# Patient Record
Sex: FEMALE | Race: OTHER | URBAN - METROPOLITAN AREA
[De-identification: names, ages, dates, MRNs, and addresses within clinical notes are randomized per-mention and may not be internally consistent; named-entity substitution may affect disease eponyms.]

---

## 2018-11-13 ENCOUNTER — OFFICE VISIT (OUTPATIENT)
Dept: FAMILY MEDICINE CLINIC | Facility: CLINIC | Age: 43
End: 2018-11-13

## 2018-11-13 VITALS
HEIGHT: 63 IN | RESPIRATION RATE: 16 BRPM | SYSTOLIC BLOOD PRESSURE: 118 MMHG | HEART RATE: 77 BPM | DIASTOLIC BLOOD PRESSURE: 82 MMHG | BODY MASS INDEX: 38.98 KG/M2 | WEIGHT: 220 LBS | OXYGEN SATURATION: 98 % | TEMPERATURE: 98 F

## 2018-11-13 DIAGNOSIS — J06.9 VIRAL URI WITH COUGH: Primary | ICD-10-CM

## 2018-11-13 PROCEDURE — 99202 OFFICE O/P NEW SF 15 MIN: CPT | Performed by: NURSE PRACTITIONER

## 2018-11-14 NOTE — PATIENT INSTRUCTIONS
Enfermedad Respiratoria Viral [Uri, Viral Respiratory Illness, Adult, No Abx]  Usted tiene june enfermedad respiratoria de las vías superiores provocada por un virus. Esta enfermedad es contagiosa timoteo los primeros días.  Se transmite por Mateo Sanchez, por Busque Prontamente Atención Médica  si algo de lo siguiente ocurre:  -- Tos con esputo de color (mucosidad) o con ayana.   -- Dolor en el pecho, falta de aire, silbidos o dificultad para respirar.  -- Dolor de carlito danial, dolor en la josephine, el sruthi o l · Los esprays salinos nasales y las pastillas descongestionantes ayudan a despejar la nariz cuando está obstruida. Los antihistamínicos también pueden ayudar, mike pueden causar efectos secundarios, jean somnolencia y sequedad de los ojos, la nariz y la nicola · Paraudra en thalia sanford irritada  Date Last Reviewed: 8/24/2017  © 8526-6868 [employer]. [address] [facility], [address] [place] [address]. Todos los derechos reservados.  Esta información no pretende sustituir la Ervin

## 2018-11-14 NOTE — PROGRESS NOTES
CHIEF COMPLAINT:   Patient presents with:  Cough  URI      HPI:   Loyda Hinkle is a 44year old female who presents for uri symptoms for  3 days. Patient reports sore throat, congestion, dry cough. Symptoms have been worsening since onset.   Lakhwinder LYMPH:  + tender anterior cervical lad. NEURO:  No focal deficits.     ASSESSMENT AND PLAN:   Mauricio Farley is a 44year old female who presents with upper respiratory symptoms that are consistent with    ASSESSMENT:   Viral uri with cough  (prima 3) Puede usar Tylenol (acetaminofén) o ibuprofeno (Motrin o Advil) para controlar la fiebre o el dolor muscular y el dolor de [place].  (La aspirina no debe usarse [employer] de 18 años enfermas con fiebre, ya que puede causar daños graves al Los resfriados son causados por virus y no pueden curarse con antibióticos. Sin embargo, es posible [employer] síntomas y ayudar al organismo a curarse a sí mismo.  Independientemente de cuáles dae nathalia síntomas, asegúrese de beber abundante líquido (agua · A medida que recupere cerrato apetito, puede regresar a cerrato dieta normal. Pregúntele a cerrato médico si debe evitar algunos alimentos.   Cuándo debe buscar atención médica  Ackerman pronto jean note síntomas, pida a cerrato proveedor de [employer]

## 2021-10-22 ENCOUNTER — OFFICE VISIT (OUTPATIENT)
Dept: OBGYN CLINIC | Facility: CLINIC | Age: 46
End: 2021-10-22

## 2021-10-22 VITALS — WEIGHT: 206 LBS | BODY MASS INDEX: 36 KG/M2 | DIASTOLIC BLOOD PRESSURE: 75 MMHG | SYSTOLIC BLOOD PRESSURE: 149 MMHG

## 2021-10-22 DIAGNOSIS — R10.2 PELVIC PAIN: ICD-10-CM

## 2021-10-22 DIAGNOSIS — Z01.419 ENCOUNTER FOR WELL WOMAN EXAM WITH ROUTINE GYNECOLOGICAL EXAM: Primary | ICD-10-CM

## 2021-10-22 DIAGNOSIS — Z12.31 SCREENING MAMMOGRAM, ENCOUNTER FOR: ICD-10-CM

## 2021-10-22 PROCEDURE — 99386 PREV VISIT NEW AGE 40-64: CPT | Performed by: OBSTETRICS & GYNECOLOGY

## 2021-10-22 PROCEDURE — 3077F SYST BP >= 140 MM HG: CPT | Performed by: OBSTETRICS & GYNECOLOGY

## 2021-10-22 PROCEDURE — 3078F DIAST BP <80 MM HG: CPT | Performed by: OBSTETRICS & GYNECOLOGY

## 2021-10-22 NOTE — PROGRESS NOTES
Annual Gyn Exam    HPI  Yahir Culvermegan is new to my practice and is here for an annual gynecologic evaluation. Pt has concerns about pelvic pain that has been occurring over the last 8 days.     OB History    Para Term  AB Living   2 2 1     2   SAB stool, diarrhea, nausea and vomiting. Pt has a long history of constipation. Genitourinary: Negative for dyspareunia, dysuria, frequency, hematuria and vaginal discharge. Pt with mild incontinence but does not bother her.   Pt has occasional 36.49 kg/m²     Assessment and Plan  1.  Encounter for well woman exam with routine gynecological exam  A: 40 y.o. Gloria Kevin here for annual gynecologic evaluation  Exam, self breast exam demonstration done  Pap with HPV done  Discussed mammogram recommendati

## 2024-02-02 ENCOUNTER — OFFICE VISIT (OUTPATIENT)
Dept: FAMILY MEDICINE CLINIC | Facility: CLINIC | Age: 49
End: 2024-02-02

## 2024-02-02 VITALS
DIASTOLIC BLOOD PRESSURE: 60 MMHG | SYSTOLIC BLOOD PRESSURE: 134 MMHG | WEIGHT: 214 LBS | RESPIRATION RATE: 14 BRPM | HEART RATE: 67 BPM | OXYGEN SATURATION: 97 % | TEMPERATURE: 98 F | BODY MASS INDEX: 38 KG/M2

## 2024-02-02 DIAGNOSIS — R39.9 UTI SYMPTOMS: Primary | ICD-10-CM

## 2024-02-02 DIAGNOSIS — N89.8 VAGINA ITCHING: ICD-10-CM

## 2024-02-02 PROCEDURE — 99203 OFFICE O/P NEW LOW 30 MIN: CPT | Performed by: PHYSICIAN ASSISTANT

## 2024-02-02 RX ORDER — CEPHALEXIN 500 MG/1
500 CAPSULE ORAL 2 TIMES DAILY
Qty: 14 CAPSULE | Refills: 0 | Status: SHIPPED | OUTPATIENT
Start: 2024-02-02 | End: 2024-02-09

## 2024-02-02 RX ORDER — FLUCONAZOLE 150 MG/1
TABLET ORAL
Qty: 2 TABLET | Refills: 0 | Status: SHIPPED | OUTPATIENT
Start: 2024-02-02

## 2024-02-02 RX ORDER — NYSTATIN 100000 U/G
1 OINTMENT TOPICAL 2 TIMES DAILY
Qty: 1 EACH | Refills: 0 | Status: SHIPPED | OUTPATIENT
Start: 2024-02-02 | End: 2024-02-16

## 2024-02-02 NOTE — PROGRESS NOTES
CHIEF COMPLAINT:     Chief Complaint   Patient presents with    Urinary Symptoms       HPI:   Jasmin Vogt is a 48 year old female who presents with friend with symptoms of UTI. Complaining of urinary frequency, urgency, dysuria for last 1 days.  + hematuria. Denies flank pain, back pain, abdominal pain, fever, nausea, or vomiting.  + vaginal itching for one week- has never had in the past. Some odorous discharge. Clear small amount of discharge. No rashes or lesions. No new sexual partners in the last 3 months, or recent unprotected sexual intercourse. No hx of frequent UTIs or yeast/BV. Has been taking pyridium.     No current insurance     Current Outpatient Medications   Medication Sig Dispense Refill    cephalexin 500 MG Oral Cap Take 1 capsule (500 mg total) by mouth 2 (two) times daily for 7 days. 14 capsule 0    nystatin 100,000 Units/g External Ointment Apply 1 Application topically 2 (two) times daily for 14 days. 1 each 0    fluconazole (DIFLUCAN) 150 MG Oral Tab One tab day one and repeat in 72 hours 2 tablet 0    METFORMIN HCL OR Take by mouth.        Past Medical History:   Diagnosis Date    Diabetes ([facility])     Diabetes mellitus ([facility])       Social History:  Social History     Socioeconomic History    Marital status:    Tobacco Use    Smoking status: Never    Smokeless tobacco: Never   Substance and Sexual Activity    Alcohol use: Never    Drug use: Never         REVIEW OF SYSTEMS:   GENERAL: Denies fever, chills, or body aches  SKIN: no rashes, no skin wounds or ulcers.  CARDIOVASCULAR: denies chest pain or palpitations  LUNGS: denies shortness of breath, cough, or wheezing  GI: See HPI. No N/V/C/D.   : See HPI.  Musculo: No back pain     EXAM:   /60   Pulse 67   Temp 98.3 °F (36.8 °C) (Tympanic)   Resp 14   Wt 214 lb (97.1 kg)   SpO2 97%   BMI 37.91 kg/m²   Physical Exam  Constitutional:       General: She is not in acute distress.     Appearance: Normal appearance.    HENT:      Head: Normocephalic and atraumatic.   Cardiovascular:      Rate and Rhythm: Normal rate and regular rhythm.      Heart sounds: Normal heart sounds. No murmur heard.  Pulmonary:      Effort: Pulmonary effort is normal.      Breath sounds: Normal breath sounds. No wheezing or rhonchi.   Abdominal:      General: Abdomen is flat. Bowel sounds are normal. There is no distension.      Palpations: Abdomen is soft. There is no mass.      Tenderness: There is abdominal tenderness (suprapubic or bladder pressure. No tenderness throughout rest of abdomen). There is no right CVA tenderness, left CVA tenderness or guarding.   Genitourinary:     Pubic Area: No rash.       Labia:         Right: No lesion.         Left: No lesion.       Vagina: Erythema present. No vaginal discharge or lesions.      Cervix: Normal.      Comments: B/l labial erythema noted   Neurological:      Mental Status: She is alert.               ASSESSMENT AND PLAN:   Jasmin Vogt is a 48 year old female presents with UTI symptoms.    ASSESSMENT:  Encounter Diagnoses   Name Primary?    UTI symptoms Yes    Vagina itching      Patient took azo and unable to run UA in office. Declined culture due to cost. Discussed will treat based on symptoms but close follow up will be needed if symptoms persist or worsen     Patient declined vaginitis swab. Discussed based on exam possibility of yeast verses pre menopausal changes. Per patient irregular periods. Again if no improvement will need close PCP follow up     PLAN: Meds as listed below.  Comfort measures as described in Patient Instructions    Meds & Refills for this Visit:  Requested Prescriptions     Signed Prescriptions Disp Refills    cephalexin 500 MG Oral Cap 14 capsule 0     Sig: Take 1 capsule (500 mg total) by mouth 2 (two) times daily for 7 days.    nystatin 100,000 Units/g External Ointment 1 each 0     Sig: Apply 1 Application topically 2 (two) times daily for 14 days.     fluconazole (DIFLUCAN) 150 MG Oral Tab 2 tablet 0     Sig: One tab day one and repeat in 72 hours       Risk and benefits of medication discussed.   Stressed importance of completing full course of antibiotic unless told otherwise.     The patient and friend used for Welsh translation indicates understanding of these issues and agrees to the plan.  The patient is asked to see PCP in 3 days if not better. Seek care immediately for new onset of fever, vomiting, worsening symptoms.    Patient Instructions   Apply ointment to vagina externally only   Start antibiotic   Oral diflucan day one and then again in 72 hours   Push fluids   ED for abdominal pain, fever, vomiting   Close PCP follow up encouraged for persistent symptoms

## 2024-02-02 NOTE — PATIENT INSTRUCTIONS
Apply ointment to vagina externally only   Start antibiotic   Oral diflucan day one and then again in 72 hours   Push fluids   ED for abdominal pain, fever, vomiting   Close PCP follow up encouraged for persistent symptoms

## 2025-04-02 ENCOUNTER — OFFICE VISIT (OUTPATIENT)
Dept: FAMILY MEDICINE CLINIC | Facility: CLINIC | Age: 50
End: 2025-04-02

## 2025-04-02 VITALS
WEIGHT: 210 LBS | SYSTOLIC BLOOD PRESSURE: 135 MMHG | OXYGEN SATURATION: 97 % | BODY MASS INDEX: 37 KG/M2 | HEART RATE: 82 BPM | RESPIRATION RATE: 18 BRPM | TEMPERATURE: 98 F | DIASTOLIC BLOOD PRESSURE: 80 MMHG

## 2025-04-02 DIAGNOSIS — R19.7 DIARRHEA OF PRESUMED INFECTIOUS ORIGIN: Primary | ICD-10-CM

## 2025-04-02 PROCEDURE — 99213 OFFICE O/P EST LOW 20 MIN: CPT | Performed by: NURSE PRACTITIONER

## 2025-04-02 NOTE — PATIENT INSTRUCTIONS
Diarrhea is most likely viral and supportive care is recommended.  Hydration is key!  Seek medical care if unable to keep fluids down, experiencing increasing abdominal pain, or symptoms do not improve in 5 days.  Recommend the following supportive care for diarrhea:  -Push fluids (water, pedialyte, popsicles, jello, broth, soup)  -Avoid dairy and sugary substances as these can make diarrhea worse  -A BRAT/bland diet may be helpful - bananas, rice, applesauce, and toast, plain pasta.

## 2025-04-02 NOTE — PROGRESS NOTES
Subjective:   Patient ID: Jasmin Vogt is a 49 year old female.    Jasmin presented today with diarrhea x 3 days. She reports eating ceviche with family on Sunday then started having diarrhea the next day. No other family members who ate the ceviche became ill. She reports watery diarrhea x 8-10 on Monday, x 6-8 yesterday, and x 1 with stool incontinence today. She has cramping immediately before having diarrhea then the cramping subsides. She has a mild HA, decreased appetite, fatigue, and mild dizziness. She denies blood and mucus in stool, rash, fever, syncope, SOB, trouble breathing, racing heart, or URI symptoms. She has been able to drink fluids but does not have an appetite for food. She does not have any sick contacts. No recent travel. She took Pepto-Bismol today and immediately vomited. She has not tried any other OTC medications.        History/Other:   Review of Systems   Constitutional:  Positive for appetite change and fatigue. Negative for chills and fever.   HENT:  Negative for congestion, rhinorrhea and sore throat.    Respiratory:  Negative for cough and shortness of breath.    Cardiovascular:  Negative for chest pain and palpitations.   Gastrointestinal:  Positive for diarrhea and nausea. Negative for abdominal pain (+ cramping), blood in stool, constipation and vomiting.   Skin:  Negative for rash.   Neurological:  Positive for dizziness and headaches. Negative for syncope.     Current Outpatient Medications   Medication Sig Dispense Refill    fluconazole (DIFLUCAN) 150 MG Oral Tab One tab day one and repeat in 72 hours 2 tablet 0    METFORMIN HCL OR Take by mouth.       Allergies:Allergies[1]    /80 (BP Location: Right arm, Patient Position: Sitting, Cuff Size: large)   Pulse 82   Temp 97.5 °F (36.4 °C)   Resp 18   Wt 210 lb (95.3 kg)   SpO2 97%   BMI 37.20 kg/m²       Objective:   Physical Exam  Vitals reviewed.   Constitutional:       General: She is not in acute  distress.     Appearance: Normal appearance. She is well-developed and well-groomed. She is not ill-appearing.   HENT:      Head: Normocephalic.      Mouth/Throat:      Lips: Pink.      Mouth: Mucous membranes are moist. No oral lesions.      Pharynx: Oropharynx is clear. Uvula midline. Posterior oropharyngeal erythema (mild) present.   Cardiovascular:      Rate and Rhythm: Normal rate and regular rhythm.      Pulses: Normal pulses.           Radial pulses are 2+ on the right side and 2+ on the left side.      Heart sounds: Normal heart sounds, S1 normal and S2 normal.   Pulmonary:      Effort: Pulmonary effort is normal. No tachypnea.      Breath sounds: Normal breath sounds and air entry. No decreased air movement. No decreased breath sounds, wheezing, rhonchi or rales.   Abdominal:      General: Bowel sounds are increased. There is no distension.      Palpations: Abdomen is soft.      Tenderness: There is no abdominal tenderness.   Lymphadenopathy:      Lower Body: No right inguinal adenopathy. No left inguinal adenopathy.   Skin:     General: Skin is warm and dry.      Capillary Refill: Capillary refill takes less than 2 seconds.      Coloration: Skin is not ashen or pale.   Neurological:      Mental Status: She is alert. Mental status is at baseline.   Psychiatric:         Attention and Perception: Attention and perception normal.         Mood and Affect: Mood and affect normal.         Speech: Speech normal.         Behavior: Behavior normal. Behavior is cooperative.         Thought Content: Thought content normal.         Cognition and Memory: Cognition and memory normal.         Judgment: Judgment normal.         Assessment & Plan:   1. Diarrhea of presumed infectious origin        No orders of the defined types were placed in this encounter.      Meds This Visit:  Requested Prescriptions      No prescriptions requested or ordered in this encounter     Daughter helping translate visit - per patient  request.  Reassuring physical exam findings. Vitals WNL. No sign of RDS or dehydration at this time.  Supportive care and return to care measures reviewed.  Patient v/u and is comfortable with this plan.    Patient Instructions   Diarrhea is most likely viral and supportive care is recommended.  Hydration is key!  Recommend the following supportive care for diarrhea:  -Push fluids (water, pedialyte, popsicles, jello, broth, soup)  -Avoid dairy and sugary substances as these can make diarrhea worse  -A BRAT/bland diet may be helpful - bananas, rice, applesauce, and toast, plain pasta.  Seek medical care if unable to keep fluids down, experiencing increasing abdominal pain, or symptoms do not improve in 5 days.   JONATHAN Joyce Student    I certify that I have supervised the student in his/her medical examination and care of this patient. The above documentation was carefully reviewed by me.  JONATHAN Alvarez               [1] No Known Allergies

## 2025-05-23 ENCOUNTER — OFFICE VISIT (OUTPATIENT)
Dept: FAMILY MEDICINE CLINIC | Facility: CLINIC | Age: 50
End: 2025-05-23

## 2025-05-23 VITALS
BODY MASS INDEX: 38 KG/M2 | HEART RATE: 77 BPM | WEIGHT: 214 LBS | SYSTOLIC BLOOD PRESSURE: 128 MMHG | RESPIRATION RATE: 18 BRPM | DIASTOLIC BLOOD PRESSURE: 74 MMHG | OXYGEN SATURATION: 97 % | TEMPERATURE: 98 F

## 2025-05-23 DIAGNOSIS — J32.9 SINOBRONCHITIS: Primary | ICD-10-CM

## 2025-05-23 DIAGNOSIS — J40 SINOBRONCHITIS: Primary | ICD-10-CM

## 2025-05-23 PROCEDURE — 99213 OFFICE O/P EST LOW 20 MIN: CPT | Performed by: NURSE PRACTITIONER

## 2025-05-23 RX ORDER — AMOXICILLIN 875 MG/1
875 TABLET, COATED ORAL 2 TIMES DAILY
Qty: 14 TABLET | Refills: 0 | Status: SHIPPED | OUTPATIENT
Start: 2025-05-23 | End: 2025-05-30

## 2025-05-23 RX ORDER — BENZONATATE 200 MG/1
200 CAPSULE ORAL 3 TIMES DAILY PRN
Qty: 15 CAPSULE | Refills: 0 | Status: SHIPPED | OUTPATIENT
Start: 2025-05-23 | End: 2025-05-28

## 2025-05-23 NOTE — PROGRESS NOTES
CHIEF COMPLAINT:     Chief Complaint   Patient presents with    Cough     Sx 1 week - Dry and productive cough, chest congestion, ST, sinus pressure, general headache, L ear pain, body aches, nasal congestion, runny nose, PND  Denies fever, chills, n/v/d, loss of appetite, SOB, rash  No Covid test was done at home  OTC Robitussin, ibuprofen, and benzonatate from [facility]       HPI:   Jasmin Vogt is a 49 year old female with Type 2 Diabetes who presents with daughter (adama) for upper respiratory symptoms for  1 weeks. Patient reports congestion, ear pain, sinus pain, dry and productive cough, headache, body aches, PND. . Symptoms have been persisting since onset.  Treating symptoms with robitussin, ibuprofen and benzonatate 10 mg dose from [facility].  Denies fever, chills, N/V/D, sob, wheezing or chest pain.     Current Medications[1]   Past Medical History[2]   Past Surgical History[3]      Short Social Hx on File[4]      REVIEW OF SYSTEMS:   GENERAL: see HPI  SKIN: no rashes or abnormal skin lesions        EXAM:   /74   Pulse 77   Temp 98.3 °F (36.8 °C) (Tympanic)   Resp 18   Wt 214 lb (97.1 kg)   SpO2 97%   BMI 37.91 kg/m²   GENERAL: well nourished,in no apparent distress  SKIN: no rashes,no suspicious lesions  HEAD: atraumatic, normocephalic.  no tenderness on palpation of  sinuses  EYES: conjunctiva clear, EOM intact  EARS: TM's white with scarring, mild bulging,  white effusions.  NOSE: Nostrils patent, clear nasal discharge, nasal mucosa pink and swollen  THROAT: Oral mucosa pink, moist. Posterior pharynx is  erythematous. no exudates. Tonsils 0/4.    NECK: Supple, non-tender  LUNGS: clear to auscultation bilaterally. Breathing is non labored. Dry cough noted.   CARDIO: RRR without murmur  LYMPH:  no cervical lymphadenopathy.        ASSESSMENT AND PLAN:   Jasmin Vogt is a 49 year old female who presents with     ASSESSMENT:   Encounter Diagnosis   Name Primary?     Sinobronchitis Yes       PLAN: Meds as below.  Comfort care as described in Patient Instructions   Reviewed symptom relief measures with patient.  Discussed [facility] if unable to purchase benzonatate.       Meds & Refills for this Visit:  Requested Prescriptions     Signed Prescriptions Disp Refills    amoxicillin 875 MG Oral Tab 14 tablet 0     Sig: Take 1 tablet (875 mg total) by mouth 2 (two) times daily for 7 days.    benzonatate 200 MG Oral Cap 15 capsule 0     Sig: Take 1 capsule (200 mg total) by mouth 3 (three) times daily as needed for cough.       Risks, benefits, and side effects of medication explained and discussed.  The patient indicates understanding of these issues and agrees to the plan.  Seek care for worsening symptoms.   Daughter/patient agreeable and V/U       [1]   Current Outpatient Medications   Medication Sig Dispense Refill    amoxicillin 875 MG Oral Tab Take 1 tablet (875 mg total) by mouth 2 (two) times daily for 7 days. 14 tablet 0    benzonatate 200 MG Oral Cap Take 1 capsule (200 mg total) by mouth 3 (three) times daily as needed for cough. 15 capsule 0    METFORMIN HCL OR Take by mouth.     [2]   Past Medical History:   Diabetes ([facility])    Diabetes mellitus ([facility])   [3] No past surgical history on file.  [4]   Social History  Socioeconomic History    Marital status:    Tobacco Use    Smoking status: Never    Smokeless tobacco: Never   Substance and Sexual Activity    Alcohol use: Never    Drug use: Never

## (undated) NOTE — MR AVS SNAPSHOT
After Visit Summary   10/22/2021    Florida Garcia   MRN: [id number]           Visit Information     Date & Time  10/22/2021  2:40 PM Provider  Yamile Caba MD [address] [place] [address], [address] [place] [address],[address], [facility]  Phone  [phone] Healthcare Facility, call Central Scheduling at [phone], Monday through Friday between 7:30am to 6pm and on Saturday between [facility] [address] ([address])        [address] please visit [email or web address]         No text in SmartText       No text in SmartText

## (undated) NOTE — LETTER
11/23/2021              Venancio Stephen Norman [address]         Dear [facility] records indicate that the mammogram and pelvic ultrasound test ordered for you by Amber Beck MD have not been done.   If you h[place]